# Patient Record
Sex: FEMALE | Race: WHITE | URBAN - METROPOLITAN AREA
[De-identification: names, ages, dates, MRNs, and addresses within clinical notes are randomized per-mention and may not be internally consistent; named-entity substitution may affect disease eponyms.]

---

## 2019-09-16 ENCOUNTER — TRANSFERRED RECORDS (OUTPATIENT)
Dept: HEALTH INFORMATION MANAGEMENT | Facility: CLINIC | Age: 59
End: 2019-09-16

## 2019-09-17 ENCOUNTER — OFFICE VISIT (OUTPATIENT)
Dept: OPHTHALMOLOGY | Facility: CLINIC | Age: 59
End: 2019-09-17
Attending: OPHTHALMOLOGY
Payer: COMMERCIAL

## 2019-09-17 DIAGNOSIS — H33.193 BULLOUS RETINOSCHISIS OF BOTH EYES: Primary | ICD-10-CM

## 2019-09-17 PROCEDURE — 92250 FUNDUS PHOTOGRAPHY W/I&R: CPT | Mod: ZF | Performed by: OPHTHALMOLOGY

## 2019-09-17 PROCEDURE — 92134 CPTRZ OPH DX IMG PST SGM RTA: CPT | Mod: ZF | Performed by: OPHTHALMOLOGY

## 2019-09-17 PROCEDURE — G0463 HOSPITAL OUTPT CLINIC VISIT: HCPCS | Mod: ZF

## 2019-09-17 RX ORDER — PREDNISONE 10 MG/1
TABLET ORAL
COMMUNITY
Start: 2019-09-13

## 2019-09-17 ASSESSMENT — REFRACTION_WEARINGRX
OD_ADD: +2.25
OS_CYLINDER: +2.25
OD_CYLINDER: +2.50
OD_AXIS: 090
OD_SPHERE: +1.25
OS_ADD: +2.25
SPECS_TYPE: PAL
OS_SPHERE: +1.25
OS_AXIS: 067

## 2019-09-17 ASSESSMENT — VISUAL ACUITY
OS_CC: 20/20
OS_CC+: -1
OD_CC+: -1
CORRECTION_TYPE: GLASSES
OD_CC: 20/30
METHOD: SNELLEN - LINEAR

## 2019-09-17 ASSESSMENT — CONF VISUAL FIELD
OD_NORMAL: 1
OS_NORMAL: 1
METHOD: COUNTING FINGERS

## 2019-09-17 ASSESSMENT — TONOMETRY
OS_IOP_MMHG: 17
OD_IOP_MMHG: 18
IOP_METHOD: TONOPEN

## 2019-09-17 ASSESSMENT — EXTERNAL EXAM - RIGHT EYE: OD_EXAM: NORMAL

## 2019-09-17 ASSESSMENT — EXTERNAL EXAM - LEFT EYE: OS_EXAM: NORMAL

## 2019-09-17 ASSESSMENT — SLIT LAMP EXAM - LIDS
COMMENTS: NORMAL
COMMENTS: NORMAL

## 2019-09-17 ASSESSMENT — CUP TO DISC RATIO
OD_RATIO: 0.3
OS_RATIO: 0.3

## 2019-09-17 NOTE — PROGRESS NOTES
CC -   Schisis OU    INTERVAL HISTORY - Initial visit    HPI -   Waleska SALDAÑA is a  58 year old year-old patient with history of retinoschisis OU.  Diagnosed with schisis 5/2018, seen by MGF, new outer holes and limited SRF noted 9/2019 referrefd for eval.  No VFD, no shadows      PAST OCULAR SURGERY  Strab surgery x 2 (childhood)    RETINAL IMAGING:  OCT 9/17/19  OD - Macula - retina normal, PHF attached   Periphery - IT schisis with outer hole & SRF  OS - Macula - retina normal, PHF attached   Periphery - IT schisis with outer hole & SRF      ASSESSMENT & PLAN    1.  Bullous schisis OU   - outer holes OU with limited SRF   - very peripheral at present   - new outer hole noted 9/2019 OS     - d/w patient observe vs pexy   - given peripheral location and likely difficulty with buckle given prior strab surgery, pexy may be reasonable   - will RTC with MGF for possible pexy vs observation      2. Syneresis OU   - advised S/Sx RD 9/2019      3. NS OU    -mild          return to clinic: with MGF next few weeks    ATTESTATION     Attending Attestation:     Complete documentation of historical and exam elements from today's encounter can be found in the full encounter summary report (not reduplicated in this progress note).  I personally obtained the chief complaint(s) and history of present illness.  I confirmed and edited as necessary the review of systems, past medical/surgical history, family history, social history, and examination findings as documented by others; and I examined the patient myself.  I personally reviewed the relevant tests, images, and reports as documented above.  I formulated and edited as necessary the assessment and plan and discussed the findings and management plan with the patient and family    Phylicia Johns MD, PhD  , Vitreoretinal Surgery  Department of Ophthalmology  [facility]

## 2019-09-17 NOTE — LETTER
9/17/2019       RE: Waleska SALDAÑA  [address]  [place] [address]     Dear Colleague,    Thank you for referring your patient, Waleska SALDAÑA, to the EYE CLINIC at [facility]. Please see a copy of my visit note below.    CC -   Schisis OU    INTERVAL HISTORY - Initial visit    HPI -   Waleska SALDAÑA is a  58 year old year-old patient with history of retinoschisis OU.  Diagnosed with schisis 5/2018, seen by MGF, new outer holes and limited SRF noted 9/2019 referrefd for eval.  No VFD, no shadows      PAST OCULAR SURGERY  Strab surgery x 2 (childhood)    RETINAL IMAGING:  OCT 9/17/19  OD - Macula - retina normal, PHF attached   Periphery - IT schisis with outer hole & SRF  OS - Macula - retina normal, PHF attached   Periphery - IT schisis with outer hole & SRF      ASSESSMENT & PLAN    1.  Bullous schisis OU   - outer holes OU with limited SRF   - very peripheral at present   - new outer hole noted 9/2019 OS     - d/w patient observe vs pexy   - given peripheral location and likely difficulty with buckle given prior strab surgery, pexy may be reasonable   - will RTC with MGF for possible pexy vs observation      2. Syneresis OU   - advised S/Sx RD 9/2019      3. NS OU    -mild    return to clinic: with MGF next few weeks    ATTESTATION   Attending Attestation:  Complete documentation of historical and exam elements from today's encounter can be found in the full encounter summary report (not reduplicated in this progress note).  I personally obtained the chief complaint(s) and history of present illness.  I confirmed and edited as necessary the review of systems, past medical/surgical history, family history, social history, and examination findings as documented by others; and I examined the patient myself.  I personally reviewed the relevant tests, images, and reports as documented above.  I formulated and edited as necessary the assessment and plan and discussed the  findings and management plan with the patient and family. Phylicia Johns MD, PhD      Base Eye Exam     Visual Acuity (Snellen - Linear)       Right Left    Dist cc 20/30 -1 20/20 -1    Dist ph cc NI     Correction:  Glasses          Tonometry (Tonopen, 8:26 AM)       Right Left    Pressure 18 17          Pupils       Dark Light Shape React APD    Right 5 4 Round Brisk None    Left 5 4 Round Brisk None          Visual Fields (Counting fingers)       Left Right     Full Full          Extraocular Movement       Right Left     Full Full          Neuro/Psych     Oriented x3:  Yes    Mood/Affect:  Normal          Dilation     Both eyes:  1.0% Mydriacyl, 2.5% Adrian Synephrine @ 8:27 AM            Slit Lamp and Fundus Exam     External Exam       Right Left    External Normal Normal          Slit Lamp Exam       Right Left    Lids/Lashes Normal Normal    Conjunctiva/Sclera White and quiet White and quiet    Cornea Clear Clear    Anterior Chamber Deep and quiet Deep and quiet    Iris Dilated Dilated    Lens 1+ NS 1+ NS    Vitreous syneresis syneresis          Fundus Exam       Right Left    Disc Normal Normal    C/D Ratio 0.3 0.3    Macula Normal Normal    Vessels Normal Normal    Periphery schisis IT with outer hole far periphery schisis IT with outer hole far periphery            Refraction     Wearing Rx       Sphere Cylinder Axis Add    Right +1.25 +2.50 090 +2.25    Left +1.25 +2.25 067 +2.25    Age:  5 years    Type:  PAL          Manifest Refraction    Just had an MR done recently                 Again, thank you for allowing me to participate in the care of your patient.      Sincerely,    Phylicia Johns MD, PhD  , Vitreoretinal Surgery  Department of Ophthalmology & Visual Neurosciences  [facility]

## 2019-09-17 NOTE — NURSING NOTE
Chief Complaint(s) and History of Present Illness(es)     Retinal Evaluation     In left eye.  Associated symptoms include Negative for dryness, eye pain, redness and tearing.  Pain was noted as 0/10.              Comments     Pt is here for a Retinal detachment evaluation, Left eye per Dr. Barboza. Pt has not noticed any changes visually, no flashes, floaters, or dark shade/curtain. Pt states that Dr. Barboza was just keeping a close watch on the Left eye for about 16 months, noticed a hole in the retina in May 2019, and now noticed since yesterday an official detachment in the LE. Overall, vision has been good no changes.     Rosie Porter, COMT 8:12 AM September 17, 2019